# Patient Record
Sex: FEMALE | Race: BLACK OR AFRICAN AMERICAN | NOT HISPANIC OR LATINO | Employment: FULL TIME | URBAN - METROPOLITAN AREA
[De-identification: names, ages, dates, MRNs, and addresses within clinical notes are randomized per-mention and may not be internally consistent; named-entity substitution may affect disease eponyms.]

---

## 2021-05-10 ENCOUNTER — PATIENT MESSAGE (OUTPATIENT)
Dept: RESEARCH | Facility: HOSPITAL | Age: 46
End: 2021-05-10

## 2023-01-19 ENCOUNTER — OCCUPATIONAL HEALTH (OUTPATIENT)
Dept: URGENT CARE | Facility: CLINIC | Age: 48
End: 2023-01-19
Payer: MEDICAID

## 2023-01-19 VITALS
BODY MASS INDEX: 27.74 KG/M2 | HEART RATE: 97 BPM | SYSTOLIC BLOOD PRESSURE: 134 MMHG | DIASTOLIC BLOOD PRESSURE: 86 MMHG | RESPIRATION RATE: 15 BRPM | WEIGHT: 183 LBS | HEIGHT: 68 IN | TEMPERATURE: 98 F | OXYGEN SATURATION: 98 %

## 2023-01-19 DIAGNOSIS — Z00.00 ENCOUNTER FOR PHYSICAL EXAMINATION: Primary | ICD-10-CM

## 2023-01-19 PROCEDURE — 99499 PHYSICAL, BASIC COMPLEXITY: ICD-10-PCS | Mod: S$GLB,,, | Performed by: FAMILY MEDICINE

## 2023-01-19 PROCEDURE — 86580 TB INTRADERMAL TEST: CPT | Mod: S$GLB,,, | Performed by: FAMILY MEDICINE

## 2023-01-19 PROCEDURE — 86580 POCT TB SKIN TEST: ICD-10-PCS | Mod: S$GLB,,, | Performed by: FAMILY MEDICINE

## 2023-01-19 PROCEDURE — 99499 UNLISTED E&M SERVICE: CPT | Mod: S$GLB,,, | Performed by: FAMILY MEDICINE

## 2023-01-21 LAB
TB INDURATION - 48 HR READ: 0 MM
TB INDURATION - 72 HR READ: 0 MM
TB SKIN TEST - 48 HR READ: NEGATIVE
TB SKIN TEST - 72 HR READ: NEGATIVE

## 2024-06-21 ENCOUNTER — TELEPHONE (OUTPATIENT)
Dept: HEMATOLOGY/ONCOLOGY | Facility: CLINIC | Age: 49
End: 2024-06-21
Payer: COMMERCIAL

## 2024-06-21 DIAGNOSIS — D50.9 IDA (IRON DEFICIENCY ANEMIA): Primary | ICD-10-CM

## 2024-06-21 NOTE — TELEPHONE ENCOUNTER
Received pt's labs and referral. Spoke with pt and scheduled first available appt. Pt accepted appt and VU of date/time/location.

## 2024-06-21 NOTE — TELEPHONE ENCOUNTER
----- Message from Thelma Potter sent at 6/21/2024 12:02 PM CDT -----  Type: Needs Medical Advice  Who Called:  Patient   Symptoms (please be specific):    How long has patient had these symptoms:    Pharmacy name and phone #:    Best Call Back Number: [phone]  Additional Information: Patient is requesting a call back to schedule a NP appt. with [facility] ASAP due to iron level results.

## 2024-06-21 NOTE — TELEPHONE ENCOUNTER
Spoke with pt in regards to wanting to schedule NP appt. Pt states her iron levels are low and PCP told her it would be good to see hematologist. Advised pt we need to get some records and lab results. Provided fax number for PCP to send referral and records. Advised pt that I will call back once received. Pt JASE.

## 2024-07-05 ENCOUNTER — TELEPHONE (OUTPATIENT)
Dept: DERMATOLOGY | Facility: CLINIC | Age: 49
End: 2024-07-05
Payer: COMMERCIAL

## 2024-07-05 ENCOUNTER — TELEPHONE (OUTPATIENT)
Dept: HEMATOLOGY/ONCOLOGY | Facility: CLINIC | Age: 49
End: 2024-07-05
Payer: COMMERCIAL

## 2024-07-05 ENCOUNTER — LAB VISIT (OUTPATIENT)
Dept: LAB | Facility: HOSPITAL | Age: 49
End: 2024-07-05
Attending: INTERNAL MEDICINE
Payer: COMMERCIAL

## 2024-07-05 ENCOUNTER — OFFICE VISIT (OUTPATIENT)
Dept: HEMATOLOGY/ONCOLOGY | Facility: CLINIC | Age: 49
End: 2024-07-05
Payer: COMMERCIAL

## 2024-07-05 VITALS
WEIGHT: 150.13 LBS | RESPIRATION RATE: 16 BRPM | HEART RATE: 90 BPM | DIASTOLIC BLOOD PRESSURE: 80 MMHG | BODY MASS INDEX: 22.83 KG/M2 | OXYGEN SATURATION: 99 % | TEMPERATURE: 98 F | SYSTOLIC BLOOD PRESSURE: 117 MMHG

## 2024-07-05 DIAGNOSIS — D50.9 IDA (IRON DEFICIENCY ANEMIA): ICD-10-CM

## 2024-07-05 DIAGNOSIS — D22.9 ATYPICAL MOLE: ICD-10-CM

## 2024-07-05 DIAGNOSIS — D50.9 IDA (IRON DEFICIENCY ANEMIA): Primary | ICD-10-CM

## 2024-07-05 DIAGNOSIS — D50.0 IRON DEFICIENCY ANEMIA DUE TO CHRONIC BLOOD LOSS: ICD-10-CM

## 2024-07-05 LAB
BASOPHILS # BLD AUTO: 0.01 K/UL (ref 0–0.2)
BASOPHILS NFR BLD: 0.2 % (ref 0–1.9)
DIFFERENTIAL METHOD BLD: ABNORMAL
EOSINOPHIL # BLD AUTO: 0 K/UL (ref 0–0.5)
EOSINOPHIL NFR BLD: 0.5 % (ref 0–8)
ERYTHROCYTE [DISTWIDTH] IN BLOOD BY AUTOMATED COUNT: 15 % (ref 11.5–14.5)
FERRITIN SERPL-MCNC: 8 NG/ML (ref 20–300)
HCT VFR BLD AUTO: 34.6 % (ref 37–48.5)
HGB BLD-MCNC: 11.1 G/DL (ref 12–16)
IMM GRANULOCYTES # BLD AUTO: 0.01 K/UL (ref 0–0.04)
IMM GRANULOCYTES NFR BLD AUTO: 0.2 % (ref 0–0.5)
IRON SERPL-MCNC: 31 UG/DL (ref 30–160)
LYMPHOCYTES # BLD AUTO: 1.1 K/UL (ref 1–4.8)
LYMPHOCYTES NFR BLD: 26 % (ref 18–48)
MCH RBC QN AUTO: 23.9 PG (ref 27–31)
MCHC RBC AUTO-ENTMCNC: 32.1 G/DL (ref 32–36)
MCV RBC AUTO: 74 FL (ref 82–98)
MONOCYTES # BLD AUTO: 0.3 K/UL (ref 0.3–1)
MONOCYTES NFR BLD: 7.5 % (ref 4–15)
NEUTROPHILS # BLD AUTO: 2.7 K/UL (ref 1.8–7.7)
NEUTROPHILS NFR BLD: 65.6 % (ref 38–73)
NRBC BLD-RTO: 0 /100 WBC
PLATELET # BLD AUTO: 280 K/UL (ref 150–450)
PMV BLD AUTO: 9.1 FL (ref 9.2–12.9)
RBC # BLD AUTO: 4.65 M/UL (ref 4–5.4)
SATURATED IRON: 7 % (ref 20–50)
TOTAL IRON BINDING CAPACITY: 414 UG/DL (ref 250–450)
TRANSFERRIN SERPL-MCNC: 280 MG/DL (ref 200–375)
WBC # BLD AUTO: 4.15 K/UL (ref 3.9–12.7)

## 2024-07-05 PROCEDURE — 36415 COLL VENOUS BLD VENIPUNCTURE: CPT | Mod: PN | Performed by: NURSE PRACTITIONER

## 2024-07-05 PROCEDURE — 99999 PR PBB SHADOW E&M-EST. PATIENT-LVL V: CPT | Mod: PBBFAC,,, | Performed by: NURSE PRACTITIONER

## 2024-07-05 PROCEDURE — 82728 ASSAY OF FERRITIN: CPT | Performed by: NURSE PRACTITIONER

## 2024-07-05 PROCEDURE — 83540 ASSAY OF IRON: CPT | Performed by: NURSE PRACTITIONER

## 2024-07-05 PROCEDURE — 85025 COMPLETE CBC W/AUTO DIFF WBC: CPT | Mod: PN | Performed by: NURSE PRACTITIONER

## 2024-07-05 RX ORDER — DIPHENHYDRAMINE HYDROCHLORIDE 50 MG/ML
50 INJECTION INTRAMUSCULAR; INTRAVENOUS ONCE AS NEEDED
OUTPATIENT
Start: 2024-07-05

## 2024-07-05 RX ORDER — HEPARIN 100 UNIT/ML
500 SYRINGE INTRAVENOUS
OUTPATIENT
Start: 2024-07-05

## 2024-07-05 RX ORDER — EPINEPHRINE 0.3 MG/.3ML
0.3 INJECTION SUBCUTANEOUS ONCE AS NEEDED
OUTPATIENT
Start: 2024-07-05

## 2024-07-05 RX ORDER — SODIUM CHLORIDE 0.9 % (FLUSH) 0.9 %
10 SYRINGE (ML) INJECTION
OUTPATIENT
Start: 2024-07-05

## 2024-07-05 NOTE — TELEPHONE ENCOUNTER
----- Message from Kristina Donald sent at 7/5/2024  9:53 AM CDT -----  Regarding: appt request  Contact: [facility]  Type: Appointment Request    Caller is requesting an appointment.      Name of Caller:[facility]    When is the first available appointment?none    Symptoms:moles are painful and bleeding    Would the patient rather a call back or a response via [facility]? Call back    Best Call Back Number:[phone]    Additional Information: pt requesting an appt.

## 2024-07-05 NOTE — PROGRESS NOTES
"Hematology Consult Note      Maciej Hitchcock   female  1975  [id number]    07/05/2024    Referral physician:    No, Primary Doctor  No address on file    Reason for consult: Anemia    Subjective:      History of Present Illness:    Maciej Hitchcock is a 48 y.o. y/o female referred for evaluation for iron deficiency anemia. She reports exhaustion and feels like she can't get through her day. She reports the fatigue has become worse over the last 2 months. 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 Her skin is "itchy crawly" at night . She reports intermittent restless legs, she is craving corn starch, occasional CAZARES, and dry skin. She reports night sweats and chills due to being carlos menopausal. She reports heavy menstrual cycles approximately every 21 days where she soaks through a large pad every 2 hours for approximately 2 days. During the first 2 days of the cycle she states, "I am out of commission" She is taking liquid iron every other day which causes constipation and upset stomach. She reports nausea and takes Zofran. She is not sleeping well due to itching and crawling feelings to her legs.     On today's visit, the pt denies fever, sore throat, neck swelling, chest pain, cough, V/D, Dysuria, hematuria, or weakness in extremities. No back pain, bone pain. Stool or urine incontinence. Denies tingling numbness in fingers/toes.       Symptoms associated with diagnosis:  Fatigue, pica, restless legs, headache, exercise intolerance, CAZARES, weakness, pallor, dry skin,     Wt loss? No unplanned weight loss, she had planned weight loss with [facility]    Questions regarding possible etiology for Anemia    Hereditary anemia  History as a child? none  Family history of blood/bone marrow disorders? none    KORI  Bleeding-menstrual, dark or red stool, urine? Heavy cycles every 21 days.   When was your last colonoscopy or endoscopy: None     B 12 Deficiency  Impaired " absorption? PPI, Antacids, bypass surgery: None    Allergies:  Review of patient's allergies indicates:   Allergen Reactions    Sulfa (sulfonamide antibiotics)      Current Medications:  Current Outpatient Medications on File Prior to Visit   Medication Sig Dispense Refill    glimepiride (AMARYL) 2 MG tablet Two tablets (4mg) with breakfast, one tablet (2mg) with dinner 90 tablet 0    glimepiride (AMARYL) 2 MG tablet TAKE TWO TABLETS BY MOUTH IN THE MORNING WITH BREAKFAST AND ONE IN THE EVENING WITH DINNER 90 tablet 0    insulin aspart (NOVOLOG) 100 unit/mL InPn pen Per sliding scale 3 mL 0    multivitamin (ONE DAILY MULTIVITAMIN) per tablet Take 1 tablet by mouth once daily.      ONGLYZA 5 mg Tab tablet TAKE ONE TABLET BY MOUTH ONCE DAILY 30 tablet 0    pantoprazole (PROTONIX) 40 MG tablet Take 1 tablet (40 mg total) by mouth once daily. 30 tablet 11    ranitidine (ZANTAC) 300 MG tablet Take 300 mg by mouth continuous prn.       rizatriptan (MAXALT) 10 MG tablet Take 1 tablet (10 mg total) by mouth as needed for Migraine. 10 tablet 3    tramadol (ULTRAM) 50 mg tablet Take 50 mg by mouth as needed for Pain.       No current facility-administered medications on file prior to visit.     Past Medical History:  Past Medical History:   Diagnosis Date    Depression     Diabetes mellitus type I     Hypertension      Past Surgical History:  Past Surgical History:   Procedure Laterality Date     SECTION      KELOID EXCISION      2011    TUBAL LIGATION       Social History:  Social History     Tobacco Use    Smoking status: Some Days     Types: Cigarettes     Start date: 12/3/2015    Smokeless tobacco: Never   Substance Use Topics    Alcohol use: No     Alcohol/week: 0.0 standard drinks of alcohol    Drug use: No     Family History:  Family History   Problem Relation Name Age of Onset    Asthma Mother      Ovarian cancer Mother      COPD Father      Diabetes Father      Heart disease Father      Hypertension Father       Diabetes Maternal Grandmother      Hypertension Maternal Grandmother      Stroke Maternal Grandmother      Hypertension Paternal Grandmother      Breast cancer Neg Hx      Cancer Neg Hx      Colon cancer Neg Hx       Review of Systems:  Review of Systems   Constitutional:  Positive for chills and malaise/fatigue.   HENT: Negative.     Eyes: Negative.    Respiratory:  Negative for cough and shortness of breath.    Cardiovascular:  Negative for chest pain.   Gastrointestinal:  Positive for nausea. Negative for abdominal pain and diarrhea.   Genitourinary:  Negative for frequency and hematuria.   Musculoskeletal:  Negative for back pain.   Skin:  Positive for rash.   Neurological:  Positive for headaches.   Endo/Heme/Allergies: Negative.  Does not bruise/bleed easily.   Psychiatric/Behavioral:  The patient is nervous/anxious and has insomnia.         Objective:     Physical Examination:  Vitals:   Vitals:    07/05/24 0906   BP: 117/80   BP Location: Right arm   Patient Position: Sitting   BP Method: Medium (Automatic)   Pulse: 90   Resp: 16   Temp: 97.5 °F (36.4 °C)   TempSrc: Temporal   SpO2: 99%   Weight: 68.1 kg (150 lb 2.1 oz)     Physical Exam  Vitals reviewed.   Constitutional:       Appearance: Normal appearance.   HENT:      Head: Normocephalic.   Eyes:      Pupils: Pupils are equal, round, and reactive to light.   Cardiovascular:      Rate and Rhythm: Normal rate and regular rhythm.      Pulses: Normal pulses.      Heart sounds: Normal heart sounds.   Pulmonary:      Effort: Pulmonary effort is normal.      Breath sounds: Normal breath sounds.   Abdominal:      General: Abdomen is flat. Bowel sounds are normal.      Palpations: Abdomen is soft.   Musculoskeletal:         General: Normal range of motion.   Skin:     General: Skin is warm.      Capillary Refill: Capillary refill takes less than 2 seconds.   Neurological:      Mental Status: She is alert.   Psychiatric:         Mood and Affect: Mood normal.     "     Behavior: Behavior normal.      Most Recent Data:    Labs in [facility] from 6/18/2024  CBC:   Lab Results   Component Value Date    WBC 5.48 12/10/2015    HGB 11.6 (L) 12/10/2015    HCT 35.9 (L) 12/10/2015     12/10/2015    MCV 82 12/10/2015    RDW 14.3 12/10/2015     BMP:   Lab Results   Component Value Date     12/10/2015    K 4.1 12/10/2015     12/10/2015    CO2 26 12/10/2015    BUN 12 03/15/2021     (H) 12/10/2015    CALCIUM 9.2 12/10/2015     LFTs:   Lab Results   Component Value Date    PROT 7.1 12/10/2015    ALBUMIN 3.7 12/10/2015    BILITOT 0.5 12/10/2015    AST 16 12/10/2015    ALKPHOS 91 12/10/2015    ALT 8 (L) 12/10/2015     Coags: No results found for: "INR", "PROTIME", "PTT"  FLP:   Lab Results   Component Value Date    CHOL 143 08/13/2015    HDL 45 08/13/2015    LDLCALC 84.2 08/13/2015    TRIG 69 08/13/2015    CHOLHDL 31.5 08/13/2015     DM:   Lab Results   Component Value Date    HGBA1C 6.8 (H) 12/10/2015    HGBA1C 6.7 (H) 08/13/2015    HGBA1C 7.6 (H) 06/21/2014    LDLCALC 84.2 08/13/2015    CREATININE 0.77 03/15/2021     Thyroid:   Lab Results   Component Value Date    TSH 1.220 05/04/2015     Anemia: No results found for: "IRON", "TIBC", "FERRITIN", "[id number]", "FOLATE"    Radiology and other Results:  No results found.      Assessment     1. KORI (iron deficiency anemia)    2. Iron deficiency anemia due to chronic blood loss    3. Atypical mole      Plan     Labs:  Orders Placed This Encounter   Procedures    Ferritin     Standing Status:   Future     Standing Expiration Date:   9/3/2025    CBC Auto Differential     Standing Status:   Future     Standing Expiration Date:   9/3/2025    Ferritin     Standing Status:   Future     Number of Occurrences:   1     Standing Expiration Date:   9/3/2025    Iron and TIBC     Standing Status:   Future     Number of Occurrences:   1     Standing Expiration Date:   9/3/2025    CBC Auto Differential     Standing Status:   Future     " Number of Occurrences:   1     Standing Expiration Date:   9/3/2025    Occult blood x 1, stool     Standing Status:   Future     Standing Expiration Date:   7/5/2025    Occult blood x 1, stool     Standing Status:   Future     Standing Expiration Date:   7/5/2025    Occult blood x 1, stool     Standing Status:   Future     Standing Expiration Date:   7/5/2025    Ambulatory referral/consult to Dermatology     Standing Status:   Future     Standing Expiration Date:   8/5/2025     Referral Priority:   Routine     Referral Type:   Consultation     Referral Reason:   Specialty Services Required     Requested Specialty:   Dermatology     Number of Visits Requested:   1        FERAHEME (FERUMOXYTOL) TWO DOSES      Follow up Dr. Mcfadden, gynecology, she is an established patient  Referral to Dermatology    Follow up in 4 months with labs    Thank you for the opportunity in participating in the care of this very nice patient. If you have any questions or concerns, please do not hesitate to contact me.     Answers submitted by the patient for this visit:  Review of Systems Questionnaire (Submitted on 7/4/2024)  appetite change : No  unexpected weight change: No  mouth sores: No  visual disturbance: No  adenopathy: No

## 2024-07-05 NOTE — TELEPHONE ENCOUNTER
Spoke with the  and she stated that she will send the message over to the dermatology dept to get the pt scheduled.

## 2024-07-10 ENCOUNTER — PATIENT MESSAGE (OUTPATIENT)
Dept: HEMATOLOGY/ONCOLOGY | Facility: CLINIC | Age: 49
End: 2024-07-10
Payer: COMMERCIAL

## 2024-07-29 ENCOUNTER — TELEPHONE (OUTPATIENT)
Dept: INFUSION THERAPY | Facility: HOSPITAL | Age: 49
End: 2024-07-29
Payer: COMMERCIAL

## 2024-07-31 ENCOUNTER — PATIENT MESSAGE (OUTPATIENT)
Dept: INFUSION THERAPY | Facility: HOSPITAL | Age: 49
End: 2024-07-31
Payer: COMMERCIAL

## 2024-08-06 ENCOUNTER — INFUSION (OUTPATIENT)
Dept: INFUSION THERAPY | Facility: HOSPITAL | Age: 49
End: 2024-08-06
Attending: NURSE PRACTITIONER
Payer: COMMERCIAL

## 2024-08-06 VITALS
DIASTOLIC BLOOD PRESSURE: 68 MMHG | RESPIRATION RATE: 18 BRPM | TEMPERATURE: 98 F | HEART RATE: 75 BPM | SYSTOLIC BLOOD PRESSURE: 106 MMHG | OXYGEN SATURATION: 99 % | BODY MASS INDEX: 22.75 KG/M2 | WEIGHT: 150.13 LBS | HEIGHT: 68 IN

## 2024-08-06 DIAGNOSIS — D50.0 IRON DEFICIENCY ANEMIA DUE TO CHRONIC BLOOD LOSS: Primary | ICD-10-CM

## 2024-08-06 PROCEDURE — 25000003 PHARM REV CODE 250: Mod: PN | Performed by: NURSE PRACTITIONER

## 2024-08-06 PROCEDURE — 96374 THER/PROPH/DIAG INJ IV PUSH: CPT | Mod: PN

## 2024-08-06 PROCEDURE — 63600175 PHARM REV CODE 636 W HCPCS: Mod: JZ,JG,PN | Performed by: NURSE PRACTITIONER

## 2024-08-06 RX ORDER — SODIUM CHLORIDE 0.9 % (FLUSH) 0.9 %
10 SYRINGE (ML) INJECTION
Status: DISCONTINUED | OUTPATIENT
Start: 2024-08-06 | End: 2024-08-06 | Stop reason: HOSPADM

## 2024-08-06 RX ORDER — DIPHENHYDRAMINE HYDROCHLORIDE 50 MG/ML
50 INJECTION INTRAMUSCULAR; INTRAVENOUS ONCE AS NEEDED
OUTPATIENT
Start: 2024-08-06

## 2024-08-06 RX ORDER — SODIUM CHLORIDE 0.9 % (FLUSH) 0.9 %
10 SYRINGE (ML) INJECTION
OUTPATIENT
Start: 2024-08-06

## 2024-08-06 RX ORDER — EPINEPHRINE 0.3 MG/.3ML
0.3 INJECTION SUBCUTANEOUS ONCE AS NEEDED
OUTPATIENT
Start: 2024-08-06

## 2024-08-06 RX ORDER — HEPARIN 100 UNIT/ML
500 SYRINGE INTRAVENOUS
OUTPATIENT
Start: 2024-08-06

## 2024-08-06 RX ADMIN — SODIUM CHLORIDE: 9 INJECTION, SOLUTION INTRAVENOUS at 01:08

## 2024-08-06 RX ADMIN — FERUMOXYTOL 510 MG: 510 INJECTION INTRAVENOUS at 01:08

## 2024-08-13 ENCOUNTER — INFUSION (OUTPATIENT)
Dept: INFUSION THERAPY | Facility: HOSPITAL | Age: 49
End: 2024-08-13
Attending: NURSE PRACTITIONER
Payer: COMMERCIAL

## 2024-08-13 VITALS
DIASTOLIC BLOOD PRESSURE: 61 MMHG | TEMPERATURE: 97 F | RESPIRATION RATE: 20 BRPM | SYSTOLIC BLOOD PRESSURE: 98 MMHG | HEIGHT: 68 IN | BODY MASS INDEX: 22.75 KG/M2 | OXYGEN SATURATION: 99 % | HEART RATE: 80 BPM | WEIGHT: 150.13 LBS

## 2024-08-13 DIAGNOSIS — D50.0 IRON DEFICIENCY ANEMIA DUE TO CHRONIC BLOOD LOSS: Primary | ICD-10-CM

## 2024-08-13 PROCEDURE — 63600175 PHARM REV CODE 636 W HCPCS: Mod: JZ,JG,PN | Performed by: NURSE PRACTITIONER

## 2024-08-13 PROCEDURE — 25000003 PHARM REV CODE 250: Mod: PN | Performed by: NURSE PRACTITIONER

## 2024-08-13 PROCEDURE — 96374 THER/PROPH/DIAG INJ IV PUSH: CPT | Mod: PN

## 2024-08-13 RX ORDER — SODIUM CHLORIDE 0.9 % (FLUSH) 0.9 %
10 SYRINGE (ML) INJECTION
OUTPATIENT
Start: 2024-08-13

## 2024-08-13 RX ORDER — EPINEPHRINE 0.3 MG/.3ML
0.3 INJECTION SUBCUTANEOUS ONCE AS NEEDED
OUTPATIENT
Start: 2024-08-13

## 2024-08-13 RX ORDER — DIPHENHYDRAMINE HYDROCHLORIDE 50 MG/ML
50 INJECTION INTRAMUSCULAR; INTRAVENOUS ONCE AS NEEDED
OUTPATIENT
Start: 2024-08-13

## 2024-08-13 RX ORDER — HEPARIN 100 UNIT/ML
500 SYRINGE INTRAVENOUS
OUTPATIENT
Start: 2024-08-13

## 2024-08-13 RX ADMIN — SODIUM CHLORIDE: 9 INJECTION, SOLUTION INTRAVENOUS at 01:08

## 2024-08-13 RX ADMIN — FERUMOXYTOL 510 MG: 510 INJECTION INTRAVENOUS at 01:08

## 2024-08-13 NOTE — PLAN OF CARE
.Pt tolerated feraheme infusion well.  No adverse reaction noted.   IV flushed with NS and D/C per protocol.  Patient left clinic in no acute distress.

## 2024-11-07 ENCOUNTER — TELEPHONE (OUTPATIENT)
Dept: HEMATOLOGY/ONCOLOGY | Facility: CLINIC | Age: 49
End: 2024-11-07
Payer: COMMERCIAL

## 2024-11-07 NOTE — TELEPHONE ENCOUNTER
LMOVM pt to remind of appt tomorrow with Danna. Contact information was given should pt need to contact us back.

## 2025-05-15 ENCOUNTER — OCCUPATIONAL HEALTH (OUTPATIENT)
Dept: URGENT CARE | Facility: CLINIC | Age: 50
End: 2025-05-15

## 2025-05-15 DIAGNOSIS — Z02.89 ENCOUNTER FOR EXAMINATION REQUIRED BY DEPARTMENT OF TRANSPORTATION (DOT): Primary | ICD-10-CM
